# Patient Record
Sex: MALE | Race: WHITE | NOT HISPANIC OR LATINO | Employment: FULL TIME
[De-identification: names, ages, dates, MRNs, and addresses within clinical notes are randomized per-mention and may not be internally consistent; named-entity substitution may affect disease eponyms.]

---

## 2021-02-10 ENCOUNTER — RECORDS - HEALTHEAST (OUTPATIENT)
Dept: ADMINISTRATIVE | Facility: OTHER | Age: 66
End: 2021-02-10

## 2021-03-04 ENCOUNTER — HOSPITAL ENCOUNTER (OUTPATIENT)
Dept: NUCLEAR MEDICINE | Facility: HOSPITAL | Age: 66
Discharge: HOME | End: 2021-03-04
Attending: UROLOGY

## 2021-03-04 DIAGNOSIS — C61 MALIGNANT NEOPLASM OF PROSTATE (H): ICD-10-CM

## 2021-03-11 ENCOUNTER — RECORDS - HEALTHEAST (OUTPATIENT)
Dept: ADMINISTRATIVE | Facility: OTHER | Age: 66
End: 2021-03-11

## 2021-03-14 ENCOUNTER — AMBULATORY - HEALTHEAST (OUTPATIENT)
Dept: SURGERY | Facility: HOSPITAL | Age: 66
End: 2021-03-14

## 2021-03-14 DIAGNOSIS — Z11.59 ENCOUNTER FOR SCREENING FOR OTHER VIRAL DISEASES: ICD-10-CM

## 2021-03-19 ENCOUNTER — HOSPITAL ENCOUNTER (OUTPATIENT)
Dept: MRI IMAGING | Facility: HOSPITAL | Age: 66
Discharge: HOME | End: 2021-03-19
Attending: UROLOGY

## 2021-03-19 DIAGNOSIS — C61 MALIGNANT NEOPLASM OF PROSTATE (H): ICD-10-CM

## 2021-03-19 RX ORDER — TAMSULOSIN HYDROCHLORIDE 0.4 MG/1
0.4 CAPSULE ORAL 2 TIMES DAILY
Status: SHIPPED | COMMUNITY
Start: 2021-03-19 | End: 2021-08-09

## 2021-03-19 ASSESSMENT — MIFFLIN-ST. JEOR: SCORE: 1585.04

## 2021-06-05 VITALS — BODY MASS INDEX: 25.05 KG/M2 | HEIGHT: 70 IN | WEIGHT: 175 LBS

## 2021-06-16 NOTE — PLAN OF CARE
MRI with sedation cancelled due to lack of H&P within 30 days.  Radiologist and MRI tech informed.  Pt refusing to try MRI without sedation and chooses to cancel.

## 2021-06-26 ENCOUNTER — HEALTH MAINTENANCE LETTER (OUTPATIENT)
Age: 66
End: 2021-06-26

## 2021-08-09 ENCOUNTER — APPOINTMENT (OUTPATIENT)
Dept: CT IMAGING | Facility: CLINIC | Age: 66
DRG: 177 | End: 2021-08-09
Attending: EMERGENCY MEDICINE
Payer: MEDICARE

## 2021-08-09 ENCOUNTER — HOSPITAL ENCOUNTER (INPATIENT)
Facility: CLINIC | Age: 66
LOS: 4 days | Discharge: HOME | DRG: 177 | End: 2021-08-13
Attending: EMERGENCY MEDICINE | Admitting: FAMILY MEDICINE
Payer: MEDICARE

## 2021-08-09 DIAGNOSIS — J12.82 PNEUMONIA DUE TO 2019 NOVEL CORONAVIRUS: ICD-10-CM

## 2021-08-09 DIAGNOSIS — U07.1 PNEUMONIA DUE TO 2019 NOVEL CORONAVIRUS: ICD-10-CM

## 2021-08-09 DIAGNOSIS — J96.01 ACUTE RESPIRATORY FAILURE WITH HYPOXIA (H): ICD-10-CM

## 2021-08-09 PROBLEM — R79.89 ELEVATED D-DIMER: Status: ACTIVE | Noted: 2021-08-09

## 2021-08-09 LAB
ALBUMIN SERPL-MCNC: 3.3 G/DL (ref 3.5–5)
ALP SERPL-CCNC: 70 U/L (ref 45–120)
ALT SERPL W P-5'-P-CCNC: 41 U/L (ref 0–45)
ANION GAP SERPL CALCULATED.3IONS-SCNC: 10 MMOL/L (ref 5–18)
AST SERPL W P-5'-P-CCNC: 63 U/L (ref 0–40)
BILIRUB SERPL-MCNC: 0.8 MG/DL (ref 0–1)
BNP SERPL-MCNC: 12 PG/ML (ref 0–60)
BUN SERPL-MCNC: 17 MG/DL (ref 8–22)
CALCIUM SERPL-MCNC: 8.8 MG/DL (ref 8.5–10.5)
CHLORIDE BLD-SCNC: 99 MMOL/L (ref 98–107)
CO2 SERPL-SCNC: 25 MMOL/L (ref 22–31)
CREAT SERPL-MCNC: 0.84 MG/DL (ref 0.7–1.3)
D DIMER PPP FEU-MCNC: 0.91 UG/ML FEU (ref 0–0.5)
ERYTHROCYTE [DISTWIDTH] IN BLOOD BY AUTOMATED COUNT: 14.2 % (ref 10–15)
GFR SERPL CREATININE-BSD FRML MDRD: >90 ML/MIN/1.73M2
GLUCOSE BLD-MCNC: 118 MG/DL (ref 70–125)
HCT VFR BLD AUTO: 42.8 % (ref 40–53)
HGB BLD-MCNC: 14.9 G/DL (ref 13.3–17.7)
HOLD SPECIMEN: NORMAL
MCH RBC QN AUTO: 33.6 PG (ref 26.5–33)
MCHC RBC AUTO-ENTMCNC: 34.8 G/DL (ref 31.5–36.5)
MCV RBC AUTO: 96 FL (ref 78–100)
PLATELET # BLD AUTO: 99 10E3/UL (ref 150–450)
POTASSIUM BLD-SCNC: 3.2 MMOL/L (ref 3.5–5)
POTASSIUM BLD-SCNC: 4 MMOL/L (ref 3.5–5)
PROCALCITONIN SERPL-MCNC: 0.09 NG/ML (ref 0–0.49)
PROT SERPL-MCNC: 7.4 G/DL (ref 6–8)
RBC # BLD AUTO: 4.44 10E6/UL (ref 4.4–5.9)
SARS-COV-2 RNA RESP QL NAA+PROBE: POSITIVE
SODIUM SERPL-SCNC: 134 MMOL/L (ref 136–145)
TROPONIN I SERPL-MCNC: 0.01 NG/ML (ref 0–0.29)
WBC # BLD AUTO: 3.1 10E3/UL (ref 4–11)

## 2021-08-09 PROCEDURE — 71275 CT ANGIOGRAPHY CHEST: CPT

## 2021-08-09 PROCEDURE — 96374 THER/PROPH/DIAG INJ IV PUSH: CPT | Mod: 59

## 2021-08-09 PROCEDURE — 87635 SARS-COV-2 COVID-19 AMP PRB: CPT | Performed by: EMERGENCY MEDICINE

## 2021-08-09 PROCEDURE — XW033E5 INTRODUCTION OF REMDESIVIR ANTI-INFECTIVE INTO PERIPHERAL VEIN, PERCUTANEOUS APPROACH, NEW TECHNOLOGY GROUP 5: ICD-10-PCS | Performed by: FAMILY MEDICINE

## 2021-08-09 PROCEDURE — 84484 ASSAY OF TROPONIN QUANT: CPT | Performed by: EMERGENCY MEDICINE

## 2021-08-09 PROCEDURE — 250N000011 HC RX IP 250 OP 636: Performed by: EMERGENCY MEDICINE

## 2021-08-09 PROCEDURE — 84145 PROCALCITONIN (PCT): CPT | Performed by: EMERGENCY MEDICINE

## 2021-08-09 PROCEDURE — 93005 ELECTROCARDIOGRAM TRACING: CPT

## 2021-08-09 PROCEDURE — 99285 EMERGENCY DEPT VISIT HI MDM: CPT | Mod: 25

## 2021-08-09 PROCEDURE — 250N000013 HC RX MED GY IP 250 OP 250 PS 637: Performed by: STUDENT IN AN ORGANIZED HEALTH CARE EDUCATION/TRAINING PROGRAM

## 2021-08-09 PROCEDURE — 36415 COLL VENOUS BLD VENIPUNCTURE: CPT | Performed by: EMERGENCY MEDICINE

## 2021-08-09 PROCEDURE — 93005 ELECTROCARDIOGRAM TRACING: CPT | Performed by: STUDENT IN AN ORGANIZED HEALTH CARE EDUCATION/TRAINING PROGRAM

## 2021-08-09 PROCEDURE — 83880 ASSAY OF NATRIURETIC PEPTIDE: CPT | Performed by: EMERGENCY MEDICINE

## 2021-08-09 PROCEDURE — 80053 COMPREHEN METABOLIC PANEL: CPT | Performed by: EMERGENCY MEDICINE

## 2021-08-09 PROCEDURE — 84132 ASSAY OF SERUM POTASSIUM: CPT | Performed by: FAMILY MEDICINE

## 2021-08-09 PROCEDURE — 99223 1ST HOSP IP/OBS HIGH 75: CPT | Mod: AI

## 2021-08-09 PROCEDURE — 120N000001 HC R&B MED SURG/OB

## 2021-08-09 PROCEDURE — 85379 FIBRIN DEGRADATION QUANT: CPT | Performed by: EMERGENCY MEDICINE

## 2021-08-09 PROCEDURE — 85014 HEMATOCRIT: CPT | Performed by: EMERGENCY MEDICINE

## 2021-08-09 PROCEDURE — C9803 HOPD COVID-19 SPEC COLLECT: HCPCS

## 2021-08-09 PROCEDURE — 36415 COLL VENOUS BLD VENIPUNCTURE: CPT | Performed by: FAMILY MEDICINE

## 2021-08-09 PROCEDURE — 258N000003 HC RX IP 258 OP 636: Performed by: STUDENT IN AN ORGANIZED HEALTH CARE EDUCATION/TRAINING PROGRAM

## 2021-08-09 PROCEDURE — 250N000011 HC RX IP 250 OP 636: Performed by: STUDENT IN AN ORGANIZED HEALTH CARE EDUCATION/TRAINING PROGRAM

## 2021-08-09 PROCEDURE — 250N000009 HC RX 250: Performed by: STUDENT IN AN ORGANIZED HEALTH CARE EDUCATION/TRAINING PROGRAM

## 2021-08-09 RX ORDER — POLYETHYLENE GLYCOL 3350 17 G/17G
17 POWDER, FOR SOLUTION ORAL DAILY PRN
Status: DISCONTINUED | OUTPATIENT
Start: 2021-08-09 | End: 2021-08-13 | Stop reason: HOSPADM

## 2021-08-09 RX ORDER — TADALAFIL 5 MG/1
5 TABLET ORAL EVERY 24 HOURS
COMMUNITY

## 2021-08-09 RX ORDER — VALACYCLOVIR HYDROCHLORIDE 500 MG/1
500 TABLET, FILM COATED ORAL EVERY OTHER DAY
Status: DISCONTINUED | OUTPATIENT
Start: 2021-08-10 | End: 2021-08-13 | Stop reason: HOSPADM

## 2021-08-09 RX ORDER — ONDANSETRON 2 MG/ML
4 INJECTION INTRAMUSCULAR; INTRAVENOUS EVERY 6 HOURS PRN
Status: DISCONTINUED | OUTPATIENT
Start: 2021-08-09 | End: 2021-08-13 | Stop reason: HOSPADM

## 2021-08-09 RX ORDER — ALBUTEROL SULFATE 90 UG/1
2 AEROSOL, METERED RESPIRATORY (INHALATION) EVERY 4 HOURS PRN
Status: DISCONTINUED | OUTPATIENT
Start: 2021-08-09 | End: 2021-08-13 | Stop reason: HOSPADM

## 2021-08-09 RX ORDER — ONDANSETRON 4 MG/1
4 TABLET, ORALLY DISINTEGRATING ORAL EVERY 6 HOURS PRN
Status: DISCONTINUED | OUTPATIENT
Start: 2021-08-09 | End: 2021-08-13 | Stop reason: HOSPADM

## 2021-08-09 RX ORDER — IOPAMIDOL 755 MG/ML
100 INJECTION, SOLUTION INTRAVASCULAR ONCE
Status: COMPLETED | OUTPATIENT
Start: 2021-08-09 | End: 2021-08-09

## 2021-08-09 RX ORDER — DEXAMETHASONE SODIUM PHOSPHATE 4 MG/ML
6 INJECTION, SOLUTION INTRA-ARTICULAR; INTRALESIONAL; INTRAMUSCULAR; INTRAVENOUS; SOFT TISSUE ONCE
Status: COMPLETED | OUTPATIENT
Start: 2021-08-09 | End: 2021-08-09

## 2021-08-09 RX ORDER — ACETAMINOPHEN 325 MG/1
975 TABLET ORAL EVERY 8 HOURS PRN
Status: DISCONTINUED | OUTPATIENT
Start: 2021-08-09 | End: 2021-08-13 | Stop reason: HOSPADM

## 2021-08-09 RX ORDER — POTASSIUM CHLORIDE 1.5 G/1.58G
40 POWDER, FOR SOLUTION ORAL ONCE
Status: COMPLETED | OUTPATIENT
Start: 2021-08-09 | End: 2021-08-09

## 2021-08-09 RX ORDER — LIDOCAINE 40 MG/G
CREAM TOPICAL
Status: DISCONTINUED | OUTPATIENT
Start: 2021-08-09 | End: 2021-08-13 | Stop reason: HOSPADM

## 2021-08-09 RX ADMIN — DEXAMETHASONE SODIUM PHOSPHATE 6 MG: 4 INJECTION, SOLUTION INTRAMUSCULAR; INTRAVENOUS at 14:24

## 2021-08-09 RX ADMIN — SODIUM CHLORIDE 50 ML: 9 INJECTION, SOLUTION INTRAVENOUS at 21:53

## 2021-08-09 RX ADMIN — ENOXAPARIN SODIUM 40 MG: 100 INJECTION SUBCUTANEOUS at 18:53

## 2021-08-09 RX ADMIN — POTASSIUM CHLORIDE 40 MEQ: 1.5 POWDER, FOR SOLUTION ORAL at 17:56

## 2021-08-09 RX ADMIN — REMDESIVIR 200 MG: 100 INJECTION, POWDER, LYOPHILIZED, FOR SOLUTION INTRAVENOUS at 20:11

## 2021-08-09 RX ADMIN — IOPAMIDOL 65 ML: 755 INJECTION, SOLUTION INTRAVENOUS at 14:34

## 2021-08-09 ASSESSMENT — ACTIVITIES OF DAILY LIVING (ADL)
DRESSING/BATHING_DIFFICULTY: NO
DIFFICULTY_EATING/SWALLOWING: NO
WALKING_OR_CLIMBING_STAIRS_DIFFICULTY: NO
HEARING_DIFFICULTY_OR_DEAF: NO
CONCENTRATING,_REMEMBERING_OR_MAKING_DECISIONS_DIFFICULTY: NO
DIFFICULTY_COMMUNICATING: NO
DOING_ERRANDS_INDEPENDENTLY_DIFFICULTY: NO
WEAR_GLASSES_OR_BLIND: NO
TOILETING_ISSUES: NO
FALL_HISTORY_WITHIN_LAST_SIX_MONTHS: NO
PATIENT_/_FAMILY_COMMUNICATION_STYLE: SPOKEN LANGUAGE (ENGLISH OR BILINGUAL)

## 2021-08-09 ASSESSMENT — ENCOUNTER SYMPTOMS
DYSURIA: 0
FEVER: 1
SORE THROAT: 0
COUGH: 1
CONFUSION: 0
VOMITING: 0
HEADACHES: 0
ABDOMINAL PAIN: 0
DIARRHEA: 0
NAUSEA: 0
SHORTNESS OF BREATH: 1
CHILLS: 1
MYALGIAS: 1

## 2021-08-09 ASSESSMENT — MIFFLIN-ST. JEOR: SCORE: 1534.69

## 2021-08-09 NOTE — H&P
Admission History and Physical   Iván Bills,  1955, MRN [id number]    [facility]  Active Problems:    Acute respiratory failure with hypoxia (H) (2021)    Pneumonia due to 2019 novel coronavirus (2021)      PCP: No primary care provider on file., None   Code status:  Full Code     Extended Emergency Contact Information  Primary Emergency Contact: DANUTA JONES  Mobile Phone: [phone]  Relation: Other       Chief Complaint: Cough and Shortness of Breath       Assessment and Plan:   Iván Bills is a 66 year old male with a history of hypercholesterolemia, prostate cancer s/p prostatectomy, unvaccinated against SARS CoV-2 who presented to the [facility] Emergency Department on the day of admission with complaints of one week of dry cough, loss of appetite, weakness, and 10 lb. weight loss found to have fever and acute respiratory failure with hypoxia.    #Acute respiratory failure with hypoxia  #Positive SARS CoV-2 PCR  Patient unvaccinated for COVID-19 with 7d of dry cough, anorexia, weakness, weight loss, started on 2L NC and dexamethasone 6mg. Differential concerning for COVID-19 PNA vs. pulmonary embolism vs. bacterial PNA vs. ACS. Favor COVID-19 PNA given positive SARS CoV-2 PCR, unvaccinated status, respiratory failure requiring O2 NC, CT chest with LLL opacity and r/o PE, mildly elevated D-dimer. EKG appropriate to r/o ACS.  - O2 supplementation via NC PRN  - CRP, CBC, BMP, D-dimer qAM for 4 days  - EKG  - COVID-19 treatment protocol   - Albuterol prn   - Dexamethasone 6mg po   - Remdesivir 200mg IV  - Pain control   - Acetaminophen q8h prn    #Hyponatremia, mild  #Hypokalemia, mild  Likely 2/2 poor oral intake and weight loss.   - 1L NS bolus x1  - BMP recheck qAM  - Potassium replacement protocol    #Thrombocytopenia  No s/s of bleeding, not at level where transfusion would be appropriate.  - Monitor  - CBC qAM     Chronic Conditions  Prostate CA, s/p prostatectomy  : Hold  "PTA Tadalafil  Herpes simplex : Continue PTA Valacyclovir    Fluids: PO  Diet: Regular   PPX: Enoxaparin (Lovenox) SubQ 40mg, Sequential Compression Boots, Ondansetron prn, Polyethylene glycol daily prn  Disposition: TBD  Status: Inpatient    Patient discussed with attending physician, Dr. Edi Merino, who agrees with the plan.     Kenny Lewis, MS4  [facility]  Please call the senior pager with any questions or concerns, 24/7: [phone].    I have seen and examined the patient.  I have discussed the case with Student Doctor Debbie.  I agree with the findings, assessment and plan.     Chico Guerra MD PGY1  [facility]  August 9, 2021      HPI:    Iván Bills is a 66 year old old male with a past medical history of hypercholesterolemia, prostate cancer s/p prostatectomy (6/2021) who presented to the [facility] Emergency Department on the day of admission with complaints of one week of dry cough and loss of appetite. He initially thought this was due to the poor air quality. He is now feeling weak and has noticed some significant weight loss. ROS negative for headache, chest pain, abdominal pain, subjective fever, LE swelling, abdominal pain, n/v, known sick contacts.    Patient went to urgent care this AM, where he was advised to come to the ER due to concern for COVID-19 pneumonia.    History is provided by the patient, who is a reliable historian.     Emergency Department Course:    Upon presentation to the Emergency Department the patient's vital signs were    ED Triage Vitals [08/09/21 1119]   Enc Vitals Group      BP (!) 149/79      Pulse 112      Resp 16      Temp 99.5  F (37.5  C)      Temp src Oral      SpO2 (S) (!) 88 %      Weight 74.8 kg (165 lb)      Height 1.778 m (5' 10\")      Head Circumference       Peak Flow       Pain Score       Pain Loc       Pain Edu?       Excl. in GC?      Initial evaluation in the Emergency Department: Patient came in at 88% O2 on RA, " was started on 2L of O2 via NC. He is a non-smoker and is non-vaccinated. CMP, CBC, D-dimer, BNP, procalcitonin, troponin, CT abd/pelvis, SARS CoV-2 PCR ordered. Was given dexamethasone 6mg.    Notable results: CT chest showed lower lung opacities consistent with COVID-19 and enlarged ascending aorta. Positive SARS CoV-2 PCR.    Patient was admitted to the Med Surg for further workup and management.     Medical History  Past Medical History:   Diagnosis Date     Cancer (H) 02/2021    prostate      Claustrophobia      Urinary tract obstruction      Patient Active Problem List   Diagnosis     Hypercholesterolemia     Gout     Cyst Of The Right Upper Eyelid     Warts     Cough     Acute respiratory failure with hypoxia (H)     Pneumonia due to 2019 novel coronavirus       Reviewed, changes/additions include: None Surgical History  He  has a past surgical history that includes Replacement Total Knee (Right).      Reviewed, changes/additions include: vasectomy (2009), prostatectomy (2021) Social History & Habits  he  reports that he has never smoked. He has never used smokeless tobacco. He reports current alcohol use of about 2.0 standard drinks of alcohol per week. He reports that he does not use drugs.     Reviewed, changes/additions include:  Lives at home with daughter and granddaughter.    Code Status: Full Code  Marital Status:   Work History: Semi-retired trophymaker  Surrogate decision maker/POA: Daughter        Allergies  No Known Allergies    Reviewed, changes/additions include: None Family History  No known conditions or diagnoses in family members.    Reviewed, changes/additions include: None   Psychosocial Needs  Social History     Social History Narrative     Not on file     Additional psychosocial needs reviewed per nursing assessment.       Prior to Admission Medications   (Not in a hospital admission)          Review of Systems:  Pertinent items are noted in HPI.       Physical Exam:   Temp:   [99.5  F (37.5  C)] 99.5  F (37.5  C)  Pulse:  [112] 112  Resp:  [16] 16  BP: (149)/(79) 149/79  SpO2:  [88 %] 88 %    General appearance: Alert, cooperative, appears stated age and no distress  Head: Normocephalic, without obvious abnormality, atraumatic  Eyes: EOMI  Ears: Hearing grossly intact  Lungs: Crackles and diminished breath sounds bilaterally  Heart: Regular rate and rhythm, S1, S2 normal, no murmur, click, rub or gallop  Abdomen: Soft, non-tender; bowel sounds normal; infra-umbilical incision present and well-healed  Extremities: Extremities normal, atraumatic, no LE edema  Pulses: 2+ and symmetric DP/PT pulses  Skin: Skin color, texture, turgor normal. No rashes or lesions, non-diaphoretic  Neurologic: CNII-XII intact bilaterally, and normal strength and sensation in all 4 extremities    Pertinent Labs  Lab Results: personally reviewed.   Results for orders placed or performed during the hospital encounter of 08/09/21   CBC (+ platelets, no diff)     Status: Abnormal   Result Value Ref Range    WBC Count 3.1 (L) 4.0 - 11.0 10e3/uL    RBC Count 4.44 4.40 - 5.90 10e6/uL    Hemoglobin 14.9 13.3 - 17.7 g/dL    Hematocrit 42.8 40.0 - 53.0 %    MCV 96 78 - 100 fL    MCH 33.6 (H) 26.5 - 33.0 pg    MCHC 34.8 31.5 - 36.5 g/dL    RDW 14.2 10.0 - 15.0 %    Platelet Count 99 (L) 150 - 450 10e3/uL   Comprehensive metabolic panel     Status: Abnormal   Result Value Ref Range    Sodium 134 (L) 136 - 145 mmol/L    Potassium 3.2 (L) 3.5 - 5.0 mmol/L    Chloride 99 98 - 107 mmol/L    Carbon Dioxide (CO2) 25 22 - 31 mmol/L    Anion Gap 10 5 - 18 mmol/L    Urea Nitrogen 17 8 - 22 mg/dL    Creatinine 0.84 0.70 - 1.30 mg/dL    Calcium 8.8 8.5 - 10.5 mg/dL    Glucose 118 70 - 125 mg/dL    Alkaline Phosphatase 70 45 - 120 U/L    AST 63 (H) 0 - 40 U/L    ALT 41 0 - 45 U/L    Protein Total 7.4 6.0 - 8.0 g/dL    Albumin 3.3 (L) 3.5 - 5.0 g/dL    Bilirubin Total 0.8 0.0 - 1.0 mg/dL    GFR Estimate >90 >60 mL/min/1.73m2   D dimer  quantitative     Status: Abnormal   Result Value Ref Range    D-Dimer Quantitative 0.91 (H) 0.00 - 0.50 ug/mL FEU    Narrative    This D-dimer assay is intended for use in conjunction with a clinical pretest probability assessment model to exclude pulmonary embolism (PE) and deep venous thrombosis (DVT) in outpatients suspected of PE or DVT. The cut-off value is 0.50 ug/mL FEU.   Troponin I (now)     Status: Normal   Result Value Ref Range    Troponin I 0.01 0.00 - 0.29 ng/mL   B-Type Natriuretic Peptide ([facility] Only)     Status: Normal   Result Value Ref Range    BNP 12 0 - 60 pg/mL   Procalcitonin     Status: Normal   Result Value Ref Range    Procalcitonin 0.09 0.00 - 0.49 ng/mL   SARS-COV2 (COVID-19) Virus RT-PCR     Status: Abnormal    Specimen: Nasopharyngeal; Swab   Result Value Ref Range    SARS CoV2 PCR Positive (A) Negative    Narrative    Testing was performed using the rima  SARS-CoV-2 & Influenza A/B Assay on the rima  Ann-Marie  System.  This test should be ordered for the detection of SARS-COV-2 in individuals who meet SARS-CoV-2 clinical and/or epidemiological criteria. Test performance is unknown in asymptomatic patients.  This test is for in vitro diagnostic use under the FDA EUA for laboratories certified under CLIA to perform moderate and/or high complexity testing. This test has not been FDA cleared or approved.  A negative test does not rule out the presence of PCR inhibitors in the specimen or target RNA in concentration below the limit of detection for the assay. The possibility of a false negative should be considered if the patient's recent exposure or clinical presentation suggests COVID-19.  [facility] are certified under the Clinical Laboratory Improvement Amendments of 1988 (CLIA-88) as qualified to perform moderate and/or high complexity laboratory testing.   Symptomatic COVID-19 Virus (Coronavirus) by PCR Nasopharyngeal     Status: Abnormal    Specimen:  Nasopharyngeal; Swab    Narrative    The following orders were created for panel order Symptomatic COVID-19 Virus (Coronavirus) by PCR Nasopharyngeal.  Procedure                               Abnormality         Status                     ---------                               -----------         ------                     SARS-COV2 (COVID-19) Vir...[[id number]]  Abnormal            Final result                 Please view results for these tests on the individual orders.        Pertinent Radiology:  Radiology Results: personally reviewed.   CT chest shows LLL opacity consistent with COVID-19 infection, no evidence of pulmonary embolism, and ascending aorta enlargement.    Cardiographics:   EKG Results: not yet collected.

## 2021-08-09 NOTE — PHARMACY-ADMISSION MEDICATION HISTORY
Pharmacy Note - Admission Medication History    Pertinent Provider Information: Patient confirms only two medications. Takes valtrex every OTHER day.      ______________________________________________________________________    Prior To Admission (PTA) med list completed and updated in EMR.       PTA Med List   Medication Sig Last Dose     tadalafil (CIALIS) 5 MG tablet Take 5 mg by mouth every 24 hours  8/9/2021 at Unknown time     valACYclovir (VALTREX) 500 MG tablet [VALACYCLOVIR (VALTREX) 500 MG TABLET] TAKE ONE TABLET BY MOUTH ONCE DAILY (Patient taking differently: Take 500 mg by mouth See Admin Instructions TAKE 1 TABLET EVERY OTHER DAY) 8/8/2021 at Unknown time       Information source(s): Patient and CareEverywhere/SureScripts  Method of interview communication: phone [phone]    Summary of Changes to PTA Med List  New: tadalafil  Discontinued: tamsulosin-confirmed not taking; no recent fill  Changed: valacyclovir-patient takes every other day    Patient was asked about OTC/herbal products specifically.  PTA med list reflects this.    In the past week, patient estimated taking medication this percent of the time:  greater than 90%.    Allergies were reviewed, assessed, and updated with the patient.      Patient does not use any multi-dose medications prior to admission.    The information provided in this note is only as accurate as the sources available at the time of the update(s).    Thank you for the opportunity to participate in the care of this patient.    Manuel Ham RPH  8/9/2021 4:03 PM

## 2021-08-09 NOTE — ED TRIAGE NOTES
Patient arrives with shortness of breath and cough for 7 days o2 sat 88% RA. Here for an xray and r/o covid from the clinic.

## 2021-08-09 NOTE — ED PROVIDER NOTES
EMERGENCY DEPARTMENT ENCOUNTER      NAME: Iván Bills  AGE: 66 year old male  YOB: 1955  MRN: [id number]  EVALUATION DATE & TIME: 2021 11:56 AM    PCP: No primary care provider on file.        Chief Complaint   Patient presents with     Cough     Shortness of Breath         FINAL IMPRESSION:  1. Pneumonia due to 2019 novel coronavirus    2. Acute respiratory failure with hypoxia (H)          ED COURSE & MEDICAL DECISION MAKIN:13 PM I met with patient to gather history and perform an initial exam. Plans for ED stay discussed. Provider wore N95 mask, face shield, gown, and gloves.   1:49 PM I rechecked and updated patient.     Pertinent Labs & Imaging studies reviewed. (See chart for details)  66 year old male presents to the Emergency Department for evaluation of this of breath    ED Course as of Aug 09 140   Mon Aug 09, 2021   1228 Patient presents hypoxic with symptoms consistent with Covid.  Also considered pneumonia, bronchitis, myocarditis      1228 Patient on 2 L of oxygen.  Will need to be admitted.  Plan for chest imaging, labs, Covid test      1400 Covid test came back positive.  Elevated D-dimer therefore CTA PE ordered.  IV dexamethasone ordered.  Patient signed to Dr. Moore pending transfer to capable facility.            At the conclusion of the encounter I discussed the results of all of the tests and the disposition. The questions were answered. The patient or family acknowledged understanding and was agreeable with the care plan.         Critical Care  Performed by: Alexandre Cedillo MD  Total critical care time: 20 minutes  Critical care time was exclusive of separately billable procedures and treating other patients.  Critical care was necessary to treat or prevent imminent or life-threatening deterioration of the following conditions: hypoxia requiring oxygen  Critical care was time spent personally by me on the following activities: development of treatment plan with  patient or surrogate, discussions with consultants, examination of patient, evaluation of patient's response to treatment, obtaining history from patient or surrogate, ordering and performing treatments and interventions, ordering and review of laboratory studies, ordering and review of radiographic studies and re-evaluation of patient's condition, this excludes any separately billable procedures.        MEDICATIONS GIVEN IN THE EMERGENCY:  Medications   dexamethasone (DECADRON) injection 6 mg (has no administration in time range)       NEW PRESCRIPTIONS STARTED AT TODAY'S ER VISIT  New Prescriptions    No medications on file            =================================================================    HPI    Patient information was obtained from: Patient    Use of Intrepreter: N/A         Iván Bills is a 66 year old male with a pertinent history of hypercholesteremia who presents to this ED via private car accompanied by self for evaluation of cough and fever.     Patient presents with 7 days of fever, shortness of breath, dry cough, and myalgias. Symptoms have been constant since onset and gradually worsening. He denies recent sick contacts or COVID-19 exposures. He has not been vaccinated for COVID-19. He has not taken any medication prior to ED arrival. Denies diarrhea, chest pain, abdominal pain, nausea, vomiting, headache, or any other complaints at this time.    REVIEW OF SYSTEMS   Review of Systems   Constitutional: Positive for chills and fever (7 days).   HENT: Negative for sore throat.    Respiratory: Positive for cough (dry) and shortness of breath.    Cardiovascular: Negative for chest pain.   Gastrointestinal: Negative for abdominal pain, diarrhea, nausea and vomiting.   Genitourinary: Negative.  Negative for dysuria.   Musculoskeletal: Positive for myalgias (7 days).   Skin: Negative for rash.   Allergic/Immunologic: Negative for immunocompromised state.   Neurological: Negative for headaches.    Psychiatric/Behavioral: Negative for confusion.        PAST MEDICAL HISTORY:  Past Medical History:   Diagnosis Date     Cancer (H) 02/2021    prostate      Claustrophobia      Urinary tract obstruction        PAST SURGICAL HISTORY:  Past Surgical History:   Procedure Laterality Date     REPLACEMENT TOTAL KNEE Right            CURRENT MEDICATIONS:    Patient's Medications   New Prescriptions    No medications on file   Previous Medications    TAMSULOSIN (FLOMAX) 0.4 MG CAP    [TAMSULOSIN (FLOMAX) 0.4 MG CAP] Take 0.4 mg by mouth 2 (two) times a day.    VALACYCLOVIR (VALTREX) 500 MG TABLET    [VALACYCLOVIR (VALTREX) 500 MG TABLET] TAKE ONE TABLET BY MOUTH ONCE DAILY   Modified Medications    No medications on file   Discontinued Medications    No medications on file       ALLERGIES:  No Known Allergies    FAMILY HISTORY:  No family history on file.    SOCIAL HISTORY:   Social History     Socioeconomic History     Marital status:      Spouse name: Not on file     Number of children: Not on file     Years of education: Not on file     Highest education level: Not on file   Occupational History     Not on file   Tobacco Use     Smoking status: Never Smoker     Smokeless tobacco: Never Used   Substance and Sexual Activity     Alcohol use: Yes     Alcohol/week: 2.0 standard drinks     Drug use: Never     Sexual activity: Not on file   Other Topics Concern     Not on file   Social History Narrative     Not on file     Social Determinants of Health     Financial Resource Strain:      Difficulty of Paying Living Expenses:    Food Insecurity:      Worried About Running Out of Food in the Last Year:      Ran Out of Food in the Last Year:    Transportation Needs:      Lack of Transportation (Medical):      Lack of Transportation (Non-Medical):    Physical Activity:      Days of Exercise per Week:      Minutes of Exercise per Session:    Stress:      Feeling of Stress :    Social Connections:      Frequency of  "Communication with Friends and Family:      Frequency of Social Gatherings with Friends and Family:      Attends [employer] Services:      Active Member of Clubs or Organizations:      Attends Club or Organization Meetings:      Marital Status:    Intimate Partner Violence:      Fear of Current or Ex-Partner:      Emotionally Abused:      Physically Abused:      Sexually Abused:        VITALS:  Patient Vitals for the past 24 hrs:   BP Temp Temp src Pulse Resp SpO2 Height Weight   08/09/21 1119 (!) 149/79 99.5  F (37.5  C) Oral 112 16 (!) 88 % 1.778 m (5' 10\") 74.8 kg (165 lb)       PHYSICAL EXAM      Vitals: BP (!) 149/79   Pulse 112   Temp 99.5  F (37.5  C) (Oral)   Resp 16   Ht 1.778 m (5' 10\")   Wt 74.8 kg (165 lb)   SpO2 (S) (!) 88%   BMI 23.68 kg/m    General: Appears in no acute distress, awake, alert, interactive.  Eyes: Conjunctivae non-injected. Sclera anicteric.  HENT: Atraumatic.  Neck: Supple.  Respiratory/Chest: Respiration unlabored. Crackles bilaterally, but no increased work of breathing. On 2 L O2 NC, O2 saturation 96%.  Heart: RRR  Abdomen: non distended  Musculoskeletal: Normal extremities. No edema or erythema.  Skin: Normal color. No rash or diaphoresis.  Neurologic: Face symmetric, moves all extremities spontaneously. Speech clear.  Psychiatric: Oriented to person, place, and time. Affect appropriate.    LAB:  All pertinent labs reviewed and interpreted.  Results for orders placed or performed during the hospital encounter of 08/09/21   CBC (+ platelets, no diff)   Result Value Ref Range    WBC Count 3.1 (L) 4.0 - 11.0 10e3/uL    RBC Count 4.44 4.40 - 5.90 10e6/uL    Hemoglobin 14.9 13.3 - 17.7 g/dL    Hematocrit 42.8 40.0 - 53.0 %    MCV 96 78 - 100 fL    MCH 33.6 (H) 26.5 - 33.0 pg    MCHC 34.8 31.5 - 36.5 g/dL    RDW 14.2 10.0 - 15.0 %    Platelet Count 99 (L) 150 - 450 10e3/uL   Comprehensive metabolic panel   Result Value Ref Range    Sodium 134 (L) 136 - 145 mmol/L    Potassium 3.2 " (L) 3.5 - 5.0 mmol/L    Chloride 99 98 - 107 mmol/L    Carbon Dioxide (CO2) 25 22 - 31 mmol/L    Anion Gap 10 5 - 18 mmol/L    Urea Nitrogen 17 8 - 22 mg/dL    Creatinine 0.84 0.70 - 1.30 mg/dL    Calcium 8.8 8.5 - 10.5 mg/dL    Glucose 118 70 - 125 mg/dL    Alkaline Phosphatase 70 45 - 120 U/L    AST 63 (H) 0 - 40 U/L    ALT 41 0 - 45 U/L    Protein Total 7.4 6.0 - 8.0 g/dL    Albumin 3.3 (L) 3.5 - 5.0 g/dL    Bilirubin Total 0.8 0.0 - 1.0 mg/dL    GFR Estimate >90 >60 mL/min/1.73m2   D dimer quantitative   Result Value Ref Range    D-Dimer Quantitative 0.91 (H) 0.00 - 0.50 ug/mL FEU   Troponin I (now)   Result Value Ref Range    Troponin I 0.01 0.00 - 0.29 ng/mL   B-Type Natriuretic Peptide ([facility] Only)   Result Value Ref Range    BNP 12 0 - 60 pg/mL   SARS-COV2 (COVID-19) Virus RT-PCR    Specimen: Nasopharyngeal; Swab   Result Value Ref Range    SARS CoV2 PCR Positive (A) Negative       RADIOLOGY:  Reviewed all pertinent imaging. Please see official radiology report.  CT Chest Pulmonary Embolism w Contrast    (Results Pending)           I, Mercy Gonsales, am serving as a scribe to document services personally performed by Dr. Cedillo based on my observation and the provider's statements to me. I, Colin Cedillo MD attest that Mercy Gonsales is acting in a scribe capacity, has observed my performance of the services and has documented them in accordance with my direction.    Colin Cedillo M.D.  Emergency Medicine  [facility] EMERGENCY ROOM  [address] [address] [phone]  Dept: [phone]       Alexandre Cedillo MD  08/09/21 [id number]

## 2021-08-10 PROBLEM — Z90.79 S/P PROSTATECTOMY: Status: ACTIVE | Noted: 2021-08-10

## 2021-08-10 LAB
ANION GAP SERPL CALCULATED.3IONS-SCNC: 10 MMOL/L (ref 5–18)
BUN SERPL-MCNC: 18 MG/DL (ref 8–22)
C REACTIVE PROTEIN LHE: 6.5 MG/DL (ref 0–0.8)
CALCIUM SERPL-MCNC: 8.9 MG/DL (ref 8.5–10.5)
CHLORIDE BLD-SCNC: 103 MMOL/L (ref 98–107)
CO2 SERPL-SCNC: 23 MMOL/L (ref 22–31)
CREAT SERPL-MCNC: 0.76 MG/DL (ref 0.7–1.3)
D DIMER PPP FEU-MCNC: 0.5 UG/ML FEU (ref 0–0.5)
ERYTHROCYTE [DISTWIDTH] IN BLOOD BY AUTOMATED COUNT: 14.5 % (ref 10–15)
GFR SERPL CREATININE-BSD FRML MDRD: >90 ML/MIN/1.73M2
GLUCOSE BLD-MCNC: 136 MG/DL (ref 70–125)
HCT VFR BLD AUTO: 41.8 % (ref 40–53)
HGB BLD-MCNC: 14.3 G/DL (ref 13.3–17.7)
HOLD SPECIMEN: NORMAL
MCH RBC QN AUTO: 32.9 PG (ref 26.5–33)
MCHC RBC AUTO-ENTMCNC: 34.2 G/DL (ref 31.5–36.5)
MCV RBC AUTO: 96 FL (ref 78–100)
PLATELET # BLD AUTO: 114 10E3/UL (ref 150–450)
POTASSIUM BLD-SCNC: 3.8 MMOL/L (ref 3.5–5)
RBC # BLD AUTO: 4.35 10E6/UL (ref 4.4–5.9)
SODIUM SERPL-SCNC: 136 MMOL/L (ref 136–145)
WBC # BLD AUTO: 2.6 10E3/UL (ref 4–11)

## 2021-08-10 PROCEDURE — 250N000011 HC RX IP 250 OP 636: Performed by: STUDENT IN AN ORGANIZED HEALTH CARE EDUCATION/TRAINING PROGRAM

## 2021-08-10 PROCEDURE — 36415 COLL VENOUS BLD VENIPUNCTURE: CPT | Performed by: STUDENT IN AN ORGANIZED HEALTH CARE EDUCATION/TRAINING PROGRAM

## 2021-08-10 PROCEDURE — 80048 BASIC METABOLIC PNL TOTAL CA: CPT | Performed by: STUDENT IN AN ORGANIZED HEALTH CARE EDUCATION/TRAINING PROGRAM

## 2021-08-10 PROCEDURE — 250N000012 HC RX MED GY IP 250 OP 636 PS 637: Performed by: STUDENT IN AN ORGANIZED HEALTH CARE EDUCATION/TRAINING PROGRAM

## 2021-08-10 PROCEDURE — 86141 C-REACTIVE PROTEIN HS: CPT | Performed by: STUDENT IN AN ORGANIZED HEALTH CARE EDUCATION/TRAINING PROGRAM

## 2021-08-10 PROCEDURE — 85027 COMPLETE CBC AUTOMATED: CPT | Performed by: STUDENT IN AN ORGANIZED HEALTH CARE EDUCATION/TRAINING PROGRAM

## 2021-08-10 PROCEDURE — 258N000003 HC RX IP 258 OP 636: Performed by: STUDENT IN AN ORGANIZED HEALTH CARE EDUCATION/TRAINING PROGRAM

## 2021-08-10 PROCEDURE — 99233 SBSQ HOSP IP/OBS HIGH 50: CPT | Mod: GC

## 2021-08-10 PROCEDURE — 120N000001 HC R&B MED SURG/OB

## 2021-08-10 PROCEDURE — 250N000009 HC RX 250: Performed by: STUDENT IN AN ORGANIZED HEALTH CARE EDUCATION/TRAINING PROGRAM

## 2021-08-10 PROCEDURE — 250N000013 HC RX MED GY IP 250 OP 250 PS 637: Performed by: STUDENT IN AN ORGANIZED HEALTH CARE EDUCATION/TRAINING PROGRAM

## 2021-08-10 PROCEDURE — 85379 FIBRIN DEGRADATION QUANT: CPT | Performed by: STUDENT IN AN ORGANIZED HEALTH CARE EDUCATION/TRAINING PROGRAM

## 2021-08-10 RX ADMIN — ENOXAPARIN SODIUM 40 MG: 100 INJECTION SUBCUTANEOUS at 18:41

## 2021-08-10 RX ADMIN — DEXAMETHASONE 6 MG: 2 TABLET ORAL at 13:02

## 2021-08-10 RX ADMIN — SODIUM CHLORIDE 50 ML: 9 INJECTION, SOLUTION INTRAVENOUS at 17:09

## 2021-08-10 RX ADMIN — REMDESIVIR 100 MG: 100 INJECTION, POWDER, LYOPHILIZED, FOR SOLUTION INTRAVENOUS at 17:07

## 2021-08-10 RX ADMIN — VALACYCLOVIR HYDROCHLORIDE 500 MG: 500 TABLET, FILM COATED ORAL at 09:27

## 2021-08-10 ASSESSMENT — MIFFLIN-ST. JEOR: SCORE: 1516.99

## 2021-08-10 NOTE — PLAN OF CARE
Problem: Gas Exchange Impaired  Goal: Optimal Gas Exchange  Outcome: No Change     Pt O2 sats remaining around 95% overnight on 4 LPM O2 NC. Continuous pulse ox in place. Pt denies SOB. Frequent, dry cough present. Pt up independently to bathroom and calling appropriately for assistance. Call light within reach.

## 2021-08-10 NOTE — PLAN OF CARE
Patients vital signs stable, sating at 97% on 4L O2. No complaints of pain or SOB. Patient tolerating oral intake. Saline locked. Patient up to bathroom with SBA, patient steady, but complaining of weakness. IV remdesivir given.

## 2021-08-10 NOTE — PROGRESS NOTES
Pt arrived to unit. VSS. Pt educated on plan of care, use of call light, and how to call for meals. Pt complains of weakness. Denies SOB and pain.  Lovenox given. Pt tolerated well. Pt sating at 97% on 4L of O2. RN started admission. Will pass on to on coming RN. Safety precautions maintained.

## 2021-08-10 NOTE — PLAN OF CARE
Problem: Adult Inpatient Plan of Care  Goal: Optimal Comfort and Wellbeing  Outcome: Improving     Problem: Gas Exchange Impaired  Goal: Optimal Gas Exchange  Outcome: Improving     Pt is on 3.5L O2 NC. Some dry nonproductive cough noted. Able to tolerate breakfast 100%. Denies having pain. K+ 3.8, to be rechecked in AM. Call light within reach.

## 2021-08-10 NOTE — PROGRESS NOTES
"CLINICAL NUTRITION SERVICES - ASSESSMENT NOTE     Nutrition Prescription    RECOMMENDATIONS FOR MDs/PROVIDERS TO ORDER:  Malnutrition Status:    None noted        REASON FOR ASSESSMENT  Iván Bills is a/an 66 year old male assessed by the dietitian for Admission Nutrition Risk Screen. Pt admitted with resp failure, Covid-19    NUTRITION HISTORY  He dropped a little weight, not significant with start of covid. States appetite is really good now     CURRENT NUTRITION ORDERS  Diet: Regular   Intake/Tolerance: 100%     LABS  Labs reviewed, CRP-6.5    MEDICATIONS  Medications reviewed; Remdesivir, Decadron    ANTHROPOMETRICS  Height: 177.8 cm (5' 10\")  Most Recent Weight: 73.1 kg (161 lb 1.6 oz)    IBW: 75 kg  BMI: Normal BMI  Weight History:   Wt Readings from Last 5 Encounters:   08/10/21 73.1 kg (161 lb 1.6 oz)     Dosing Weight: 73 kg    ASSESSED NUTRITION NEEDS  Estimated Energy Needs: 4960-1519 kcals/day (25 - 30 kcals/kg)  Justification: Maintenance  Estimated Protein Needs: 73-88 grams protein/day (1 - 1.2 grams of pro/kg)  Justification: Maintenance  Estimated Fluid Needs: 5722-9139 mL/day (25 - 30 mL/kg)   Justification: Maintenance    MALNUTRITION  Pt does not meet 2 criteria for malnutrition     NUTRITION DIAGNOSIS  None at this time     INTERVENTIONS  Implementation  None at this time     Goals  Continue to meet est needs      Monitoring/Evaluation  Progress toward goals will be monitored and evaluated per protocol.  "

## 2021-08-10 NOTE — PROGRESS NOTES
Daily Progress Note    Assessment & Plan: Iván Bills is a 66 year old male with a past medical history of prostate cancer s/p prostatectomy on 6/8/2021 admitted for acute respiratory failure 2/2 Covid infection.     Principal Problem:    Acute respiratory failure with hypoxia (H)  Active Problems:    Pneumonia due to 2019 novel coronavirus    Elevated d-dimer    #Acute respiratory failure with hypoxia 2/2 SARS CoV-2 infection  Patient had 1 week of cough and anorexia. Tested positive for Covid in the [facility] ED on 8/9/2021. Patient is unvaccinated against Covid. As of 8/10, D-dimer decreased from 0.91 to 0.50. CRP elevated at 6.5. O2 is at 96% on 4L NC.  - O2 supplementation via NC PRN, wean as tolerated  - CRP, CBC, BMP, D-dimer qAM for 4 days  - COVID-19 treatment protocol              - Albuterol prn              - Dexamethasone 6mg po              - Remdesivir 200mg IV  - Pain control              - Acetaminophen q8h prn    #Hyponatremia, mild, resolved  #Hypokalemia, mild, resolved  Likely 2/2 poor oral intake and weight loss, resolved as of 8/10.  - Potassium replacement protocol     #Thrombocytopenia  No s/s of bleeding as of 8/10, not at level where transfusion would be appropriate.  - Monitor  - CBC qAM     Chronic Conditions  Prostate CA, s/p prostatectomy  : Hold PTA Tadalafil  Herpes simplex : Continue PTA Valacyclovir    Diet: Regular   Fluids: PO   VTE Prophylaxis: Enoxaparin SubQ 40 mg     Discharge Planning discussed with Dr. Merino  Barriers to discharge: Medical, requiring supplemental O2  Anticipated discharge: 1-3 days  Disposition:  Home  Status: Inpatient admit  Length of Stay: 1     Patient discussed with attending physician, Dr. Eid Mernio, who agrees with the plan.     Kenny Lewis, MS4, 8/10/2021  [facility] Family Medicine Residency Program  Please call the senior pager with any questions or concerns, 24/7: [phone].    I have seen and examined the  "patient.  I have discussed the case with Student Doctor Kenny Lewis.  I agree with the findings, assessment and plan.     Chico Guerra MD PGY1  [facility] Medicine Residency  August 10, 2021    Subjective/Interval events:   Patient slept well over night with no acute events. Reports improvement in weakness and reduced cough. Hasn't needed any prn pain medications. BMs and urination have been normal. Requesting information about CT chest findings.    ROS: Negative for chest pain, swelling, HA, lightheadedness, n/v, urine or bowel changes    Objective  Vital signs in last 24 hours  Temp:  [97.7  F (36.5  C)-99.2  F (37.3  C)] 97.9  F (36.6  C)  Pulse:  [] 71  Resp:  [16] 16  BP: (128-187)/() 145/82  FiO2 (%):  [4 %] 4 %  SpO2:  [93 %-97 %] 94 %    Physical Exam  BP (!) 145/82 (BP Location: Right arm)   Pulse 71   Temp 97.9  F (36.6  C) (Oral)   Resp 16   Ht 1.778 m (5' 10\")   Wt 73.1 kg (161 lb 1.6 oz)   SpO2 94%   BMI 23.12 kg/m      General Appearance:    Alert, cooperative, no distress, appears stated age   Head:    Normocephalic, without obvious abnormality, atraumatic   Eyes:    PERRL, EOMI   Lungs:     Crackles in lung bases bilaterally, no labored breathing   Heart:    Regular rate and rhythm, S1 and S2 normal, no murmur, rub   or gallop   Extremities:   Extremities normal, atraumatic, no cyanosis or edema   Pulses:   2+ and symmetric all extremities         Intake/Output last 3 shifts  I/O last 3 completed shifts:  In: 140 [P.O.:140]  Out: -   Pertinent Labs   Recent Labs   Lab 08/10/21  0622 08/09/21  1254   WBC 2.6* 3.1*   HGB 14.3 14.9   HCT 41.8 42.8   * 99*     Recent Labs   Lab 08/10/21  0622 08/09/21  1254    134*   CO2 23 25   BUN 18 17   ALBUMIN  --  3.3*   ALKPHOS  --  70   ALT  --  41   AST  --  63*     Pertinent Radiology    CT Chest Pulmonary Embolism w Contrast    Result Date: 8/9/2021  IMPRESSION: 1.  Motion artifact. 2.  No obvious pulmonary embolism. 3.  " Mild-moderate left lower lung predominant opacities typical of COVID. 4.  Enlarged ascending aorta up to 4.2 cm.   Results discussed with patient.    Current Medications.     remdesivir  100 mg Intravenous Q24H    And     sodium chloride 0.9%  50 mL Intravenous Q24H     dexamethasone  6 mg Oral Daily     enoxaparin ANTICOAGULANT  40 mg Subcutaneous Q24H     sodium chloride (PF)  3 mL Intracatheter Q8H     valACYclovir  500 mg Oral Every Other Day     PRN: acetaminophen, albuterol

## 2021-08-11 LAB
ANION GAP SERPL CALCULATED.3IONS-SCNC: 10 MMOL/L (ref 5–18)
ATRIAL RATE - MUSE: 76 BPM
BUN SERPL-MCNC: 19 MG/DL (ref 8–22)
C REACTIVE PROTEIN LHE: 2.7 MG/DL (ref 0–0.8)
CALCIUM SERPL-MCNC: 8.8 MG/DL (ref 8.5–10.5)
CHLORIDE BLD-SCNC: 106 MMOL/L (ref 98–107)
CO2 SERPL-SCNC: 23 MMOL/L (ref 22–31)
CREAT SERPL-MCNC: 0.73 MG/DL (ref 0.7–1.3)
D DIMER PPP FEU-MCNC: 0.44 UG/ML FEU (ref 0–0.5)
DIASTOLIC BLOOD PRESSURE - MUSE: NORMAL MMHG
ERYTHROCYTE [DISTWIDTH] IN BLOOD BY AUTOMATED COUNT: 14.5 % (ref 10–15)
GFR SERPL CREATININE-BSD FRML MDRD: >90 ML/MIN/1.73M2
GLUCOSE BLD-MCNC: 134 MG/DL (ref 70–125)
HCT VFR BLD AUTO: 41.7 % (ref 40–53)
HGB BLD-MCNC: 14.1 G/DL (ref 13.3–17.7)
INTERPRETATION ECG - MUSE: NORMAL
MCH RBC QN AUTO: 33 PG (ref 26.5–33)
MCHC RBC AUTO-ENTMCNC: 33.8 G/DL (ref 31.5–36.5)
MCV RBC AUTO: 98 FL (ref 78–100)
P AXIS - MUSE: 60 DEGREES
PLATELET # BLD AUTO: 151 10E3/UL (ref 150–450)
POTASSIUM BLD-SCNC: 3.6 MMOL/L (ref 3.5–5)
PR INTERVAL - MUSE: 146 MS
QRS DURATION - MUSE: 74 MS
QT - MUSE: 424 MS
QTC - MUSE: 477 MS
R AXIS - MUSE: 39 DEGREES
RBC # BLD AUTO: 4.27 10E6/UL (ref 4.4–5.9)
SODIUM SERPL-SCNC: 139 MMOL/L (ref 136–145)
SYSTOLIC BLOOD PRESSURE - MUSE: NORMAL MMHG
T AXIS - MUSE: 38 DEGREES
VENTRICULAR RATE- MUSE: 76 BPM
WBC # BLD AUTO: 3.2 10E3/UL (ref 4–11)

## 2021-08-11 PROCEDURE — 86141 C-REACTIVE PROTEIN HS: CPT | Performed by: STUDENT IN AN ORGANIZED HEALTH CARE EDUCATION/TRAINING PROGRAM

## 2021-08-11 PROCEDURE — 250N000011 HC RX IP 250 OP 636: Performed by: STUDENT IN AN ORGANIZED HEALTH CARE EDUCATION/TRAINING PROGRAM

## 2021-08-11 PROCEDURE — 85014 HEMATOCRIT: CPT | Performed by: STUDENT IN AN ORGANIZED HEALTH CARE EDUCATION/TRAINING PROGRAM

## 2021-08-11 PROCEDURE — 36415 COLL VENOUS BLD VENIPUNCTURE: CPT | Performed by: STUDENT IN AN ORGANIZED HEALTH CARE EDUCATION/TRAINING PROGRAM

## 2021-08-11 PROCEDURE — 99233 SBSQ HOSP IP/OBS HIGH 50: CPT | Mod: GC

## 2021-08-11 PROCEDURE — 250N000009 HC RX 250: Performed by: STUDENT IN AN ORGANIZED HEALTH CARE EDUCATION/TRAINING PROGRAM

## 2021-08-11 PROCEDURE — 85379 FIBRIN DEGRADATION QUANT: CPT | Performed by: STUDENT IN AN ORGANIZED HEALTH CARE EDUCATION/TRAINING PROGRAM

## 2021-08-11 PROCEDURE — 80048 BASIC METABOLIC PNL TOTAL CA: CPT | Performed by: STUDENT IN AN ORGANIZED HEALTH CARE EDUCATION/TRAINING PROGRAM

## 2021-08-11 PROCEDURE — 250N000012 HC RX MED GY IP 250 OP 636 PS 637: Performed by: STUDENT IN AN ORGANIZED HEALTH CARE EDUCATION/TRAINING PROGRAM

## 2021-08-11 PROCEDURE — 258N000003 HC RX IP 258 OP 636: Performed by: STUDENT IN AN ORGANIZED HEALTH CARE EDUCATION/TRAINING PROGRAM

## 2021-08-11 PROCEDURE — 120N000001 HC R&B MED SURG/OB

## 2021-08-11 RX ADMIN — DEXAMETHASONE 6 MG: 2 TABLET ORAL at 13:41

## 2021-08-11 RX ADMIN — ENOXAPARIN SODIUM 40 MG: 100 INJECTION SUBCUTANEOUS at 17:33

## 2021-08-11 RX ADMIN — SODIUM CHLORIDE 50 ML: 9 INJECTION, SOLUTION INTRAVENOUS at 17:23

## 2021-08-11 RX ADMIN — REMDESIVIR 100 MG: 100 INJECTION, POWDER, LYOPHILIZED, FOR SOLUTION INTRAVENOUS at 17:17

## 2021-08-11 ASSESSMENT — ACTIVITIES OF DAILY LIVING (ADL): DEPENDENT_IADLS:: INDEPENDENT

## 2021-08-11 NOTE — PLAN OF CARE
Problem: Gas Exchange Impaired  Goal: Optimal Gas Exchange  Outcome: No Change  Intervention: Optimize Oxygenation and Ventilation  Recent Flowsheet Documentation  Taken 8/10/2021 1715 by Marce Elkins RN  Head of Bed (HOB) Positioning: (eating dinner) HOB at 60-90 degrees      Attempted to decrease O2 from 3.5lpm to 3lpm.  Patient's  oxygen dipped from 96-97% on 3.5lpm to 90-91% at about 9pm. Patient denies any increased work of breathing or SOB at  90-91%.       At 2100,  Increased O2 to 3.5 lpm for bedtime.    Continue monitor    Encouraged coughing and deep breathing.

## 2021-08-11 NOTE — PROGRESS NOTES
Care Management Initial Consult    General Information  Assessment completed with: Patient,    Type of CM/SW Visit: CM Role Introduction    Primary Care Provider verified and updated as needed: No (He is searching for one )   Readmission within the last 30 days:           Advance Care Planning: Advance Care Planning Reviewed: no concerns identified      Communication Assessment  Patient's communication style: spoken language (English or Bilingual)    Hearing Difficulty or Deaf: no   Wear Glasses or Blind: no    Cognitive  Cognitive/Neuro/Behavioral: WDL           Mood/Behavior: flat affect          Living Environment:   People in home: child(rosalba), adult     Current living Arrangements: house      Able to return to prior arrangements: yes       Family/Social Support:  Care provided by: self  Provides care for: no one  Marital Status: Single  Children          Description of Support System: Supportive, Involved       Current Resources:   Patient receiving home care services: No     Community Resources: None  Equipment currently used at home: none  Supplies currently used at home: None    Employment/Financial:  Employment Status: employed part-time        Financial Concerns: No concerns identified   Referral to Financial Counselor: No       Lifestyle & Psychosocial Needs:  Social Determinants of Health     Tobacco Use: Low Risk      Smoking Tobacco Use: Never Smoker     Smokeless Tobacco Use: Never Used   Alcohol Use:      Frequency of Alcohol Consumption:      Average Number of Drinks:      Frequency of Binge Drinking:    Financial Resource Strain:      Difficulty of Paying Living Expenses:    Food Insecurity:      Worried About Running Out of Food in the Last Year:      Ran Out of Food in the Last Year:    Transportation Needs:      Lack of Transportation (Medical):      Lack of Transportation (Non-Medical):    Physical Activity:      Days of Exercise per Week:      Minutes of Exercise per Session:    Stress:       Feeling of Stress :    Social Connections:      Frequency of Communication with Friends and Family:      Frequency of Social Gatherings with Friends and Family:      Attends [employer] Services:      Active Member of Clubs or Organizations:      Attends Club or Organization Meetings:      Marital Status:    Intimate Partner Violence:      Fear of Current or Ex-Partner:      Emotionally Abused:      Physically Abused:      Sexually Abused:    Depression:      PHQ-2 Score:    Housing Stability:      Unable to Pay for Housing in the Last Year:      Number of Places Lived in the Last Year:      Unstable Housing in the Last Year:        Functional Status:  Prior to admission patient needed assistance:   Dependent ADLs:: Independent  Dependent IADLs:: Independent       Mental Health Status:  Mental Health Status: No Current Concerns       Chemical Dependency Status:  Chemical Dependency Status: No Current Concerns             Values/Beliefs:  Spiritual, Cultural Beliefs, [employer] Practices, Values that affect care: no               Additional Information:  SWCM called and introduced self and CM services to Pt via the phone due to CVOID +.  Pt recently move in with daughter in a house.  Pt independent at baseline. No CM needs anticipated.  Family to transport.     FATIMAH Dempsey

## 2021-08-11 NOTE — PLAN OF CARE
Problem: Adult Inpatient Plan of Care  Goal: Plan of Care Review  8/11/2021 1351 by Kellie Cardona RN  Outcome: Improving  Problem: Gas Exchange Impaired  Goal: Optimal Gas Exchange  8/11/2021 1351 by Kellie Cardona RN  Outcome: same    Pt continues to need O2.  He is presently on 3 L of O2 with sats in the low 90s.  He states he doesn't get SOB when he is up.  Tried to wean pt this shift but he dropped to 87%.  Pt pt back on 3 L.  He is also working on his IS and gets it up to 1500.  Pt calls appropriately for assistance.  Will continue to monitor.

## 2021-08-11 NOTE — PLAN OF CARE
Problem: Adult Inpatient Plan of Care  Goal: Plan of Care Review  8/11/2021 0454 by Kathy Chavez RN  Outcome: Improving     Problem: Gas Exchange Impaired  Goal: Optimal Gas Exchange  Outcome: Improving     Patient on 3LPM of O2 has been 90-91% with activity and 95-97% at rest.   Has denied pain tonight.  On K+ protocol with recheck scheduled for this morning  Up independently in room  IV saline locked, flushed with difficulty

## 2021-08-11 NOTE — PROGRESS NOTES
Daily Progress Note    Assessment & Plan: Iván Bills is a 66 year old male with a past medical history of prostate cancer s/p prostatectomy on 6/8/2021 admitted for acute respiratory failure 2/2 Covid infection.     Principal Problem:    Acute respiratory failure with hypoxia (H)  Active Problems:    Pneumonia due to 2019 novel coronavirus    Elevated d-dimer    #Acute respiratory failure with hypoxia 2/2 SARS CoV-2 infection  Patient had 1 week of cough and anorexia. Tested positive for Covid in the [facility] ED on 8/9/2021. Patient is unvaccinated against Covid. As of 8/10, D-dimer decreased from 0.50 to 0.44. CRP down trending from 6.5 to 2.7. O2 is at 96% on 3L NC.  - O2 supplementation via NC PRN, wean as tolerated  - CRP, CBC, BMP, D-dimer qAM for 4 days  - COVID-19 treatment protocol              - Albuterol prn              - Dexamethasone 6mg po              - Remdesivir 100mg IV  - Pain control              - Acetaminophen q8h prn    #Hyponatremia, mild, resolved  #Hypokalemia, mild, resolved  Likely 2/2 poor oral intake and weight loss, resolved as of 8/10.  - Potassium replacement protocol     #Thrombocytopenia  No s/s of bleeding as of 8/10, not at level where transfusion would be appropriate.  - Monitor  - CBC qAM     Chronic Conditions  Prostate CA, s/p prostatectomy  : Hold PTA Tadalafil  Herpes simplex : Continue PTA Valacyclovir    Diet: Regular   Fluids: PO   VTE Prophylaxis: Enoxaparin SubQ 40 mg     Discharge Planning discussed with Dr. Merino  Barriers to discharge: Medical, requiring supplemental O2  Anticipated discharge: 1-3 days  Disposition:  Home  Status: Inpatient admit  Length of Stay: 2     Patient discussed with attending physician, Dr. Edi Merino, who agrees with the plan.     Chico Guerra MD PGY1  [facility] Medicine Residency  August 11, 2021    Subjective/Interval events:   No complaints of cough or SOB. Hasn't needed any prn pain medications. BMs and urination  "continue to be normal. He is sitting comfortably in his bed this morning watching television.    ROS: Negative for chest pain, SOB, swelling, pain.     Objective  Vital signs in last 24 hours  Temp:  [97.2  F (36.2  C)-97.7  F (36.5  C)] 97.6  F (36.4  C)  Pulse:  [60-65] 65  Resp:  [18] 18  BP: (141-145)/(80-84) 141/80  SpO2:  [87 %-98 %] 96 %    Physical Exam  BP (!) 141/80 (BP Location: Right arm)   Pulse 65   Temp 97.6  F (36.4  C) (Oral)   Resp 18   Ht 1.778 m (5' 10\")   Wt 73.1 kg (161 lb 1.6 oz)   SpO2 96%   BMI 23.12 kg/m      General Appearance:    Alert, cooperative, no distress, appears stated age   Head:    Normocephalic, without obvious abnormality, atraumatic       Lungs:   Still crackles in the bases but they seem to be improving. Breathing is not labored   Heart:    Regular rate and rhythm, S1 and S2 normal, no murmur, rub   or gallop   Extremities:   Extremities normal, atraumatic, no cyanosis or edema             Intake/Output last 3 shifts  I/O last 3 completed shifts:  In: 720 [P.O.:720]  Out: -   Pertinent Labs   Recent Labs   Lab 08/11/21  0716 08/10/21  0622 08/09/21  1254   WBC 3.2* 2.6* 3.1*   HGB 14.1 14.3 14.9   HCT 41.7 41.8 42.8    114* 99*     Recent Labs   Lab 08/11/21  0716 08/10/21  0622 08/09/21  1254    136 134*   CO2 23 23 25   BUN 19 18 17   ALBUMIN  --   --  3.3*   ALKPHOS  --   --  70   ALT  --   --  41   AST  --   --  63*     Pertinent Radiology    No new imaging    Current Medications.     remdesivir  100 mg Intravenous Q24H    And     sodium chloride 0.9%  50 mL Intravenous Q24H     dexamethasone  6 mg Oral Daily     enoxaparin ANTICOAGULANT  40 mg Subcutaneous Q24H     sodium chloride (PF)  3 mL Intracatheter Q8H     valACYclovir  500 mg Oral Every Other Day     PRN: acetaminophen, albuterol  "

## 2021-08-12 LAB
ANION GAP SERPL CALCULATED.3IONS-SCNC: 8 MMOL/L (ref 5–18)
BUN SERPL-MCNC: 19 MG/DL (ref 8–22)
C REACTIVE PROTEIN LHE: 1.5 MG/DL (ref 0–0.8)
CALCIUM SERPL-MCNC: 8.8 MG/DL (ref 8.5–10.5)
CHLORIDE BLD-SCNC: 108 MMOL/L (ref 98–107)
CO2 SERPL-SCNC: 22 MMOL/L (ref 22–31)
CREAT SERPL-MCNC: 0.71 MG/DL (ref 0.7–1.3)
D DIMER PPP FEU-MCNC: 0.5 UG/ML FEU (ref 0–0.5)
ERYTHROCYTE [DISTWIDTH] IN BLOOD BY AUTOMATED COUNT: 14.6 % (ref 10–15)
GFR SERPL CREATININE-BSD FRML MDRD: >90 ML/MIN/1.73M2
GLUCOSE BLD-MCNC: 106 MG/DL (ref 70–125)
HCT VFR BLD AUTO: 44.6 % (ref 40–53)
HGB BLD-MCNC: 15.1 G/DL (ref 13.3–17.7)
MCH RBC QN AUTO: 33 PG (ref 26.5–33)
MCHC RBC AUTO-ENTMCNC: 33.9 G/DL (ref 31.5–36.5)
MCV RBC AUTO: 97 FL (ref 78–100)
PLAT MORPH BLD: NORMAL
PLATELET # BLD AUTO: 219 10E3/UL (ref 150–450)
POTASSIUM BLD-SCNC: 3.6 MMOL/L (ref 3.5–5)
RBC # BLD AUTO: 4.58 10E6/UL (ref 4.4–5.9)
RBC MORPH BLD: NORMAL
SODIUM SERPL-SCNC: 138 MMOL/L (ref 136–145)
WBC # BLD AUTO: 7 10E3/UL (ref 4–11)

## 2021-08-12 PROCEDURE — 85027 COMPLETE CBC AUTOMATED: CPT | Performed by: STUDENT IN AN ORGANIZED HEALTH CARE EDUCATION/TRAINING PROGRAM

## 2021-08-12 PROCEDURE — 36415 COLL VENOUS BLD VENIPUNCTURE: CPT | Performed by: STUDENT IN AN ORGANIZED HEALTH CARE EDUCATION/TRAINING PROGRAM

## 2021-08-12 PROCEDURE — 85379 FIBRIN DEGRADATION QUANT: CPT | Performed by: STUDENT IN AN ORGANIZED HEALTH CARE EDUCATION/TRAINING PROGRAM

## 2021-08-12 PROCEDURE — 120N000001 HC R&B MED SURG/OB

## 2021-08-12 PROCEDURE — 250N000009 HC RX 250: Performed by: STUDENT IN AN ORGANIZED HEALTH CARE EDUCATION/TRAINING PROGRAM

## 2021-08-12 PROCEDURE — 99232 SBSQ HOSP IP/OBS MODERATE 35: CPT | Mod: GC

## 2021-08-12 PROCEDURE — 258N000003 HC RX IP 258 OP 636: Performed by: STUDENT IN AN ORGANIZED HEALTH CARE EDUCATION/TRAINING PROGRAM

## 2021-08-12 PROCEDURE — 250N000011 HC RX IP 250 OP 636: Performed by: STUDENT IN AN ORGANIZED HEALTH CARE EDUCATION/TRAINING PROGRAM

## 2021-08-12 PROCEDURE — 80048 BASIC METABOLIC PNL TOTAL CA: CPT | Performed by: STUDENT IN AN ORGANIZED HEALTH CARE EDUCATION/TRAINING PROGRAM

## 2021-08-12 PROCEDURE — 86141 C-REACTIVE PROTEIN HS: CPT | Performed by: STUDENT IN AN ORGANIZED HEALTH CARE EDUCATION/TRAINING PROGRAM

## 2021-08-12 PROCEDURE — 250N000013 HC RX MED GY IP 250 OP 250 PS 637: Performed by: STUDENT IN AN ORGANIZED HEALTH CARE EDUCATION/TRAINING PROGRAM

## 2021-08-12 PROCEDURE — 250N000012 HC RX MED GY IP 250 OP 636 PS 637: Performed by: STUDENT IN AN ORGANIZED HEALTH CARE EDUCATION/TRAINING PROGRAM

## 2021-08-12 RX ORDER — DEXAMETHASONE 6 MG/1
6 TABLET ORAL DAILY
Qty: 6 TABLET | Refills: 0 | Status: SHIPPED | OUTPATIENT
Start: 2021-08-13 | End: 2021-08-19

## 2021-08-12 RX ADMIN — POLYETHYLENE GLYCOL 3350 17 G: 17 POWDER, FOR SOLUTION ORAL at 10:06

## 2021-08-12 RX ADMIN — SODIUM CHLORIDE 50 ML: 9 INJECTION, SOLUTION INTRAVENOUS at 18:23

## 2021-08-12 RX ADMIN — DEXAMETHASONE 6 MG: 2 TABLET ORAL at 13:51

## 2021-08-12 RX ADMIN — VALACYCLOVIR HYDROCHLORIDE 500 MG: 500 TABLET, FILM COATED ORAL at 10:02

## 2021-08-12 RX ADMIN — ENOXAPARIN SODIUM 40 MG: 100 INJECTION SUBCUTANEOUS at 18:20

## 2021-08-12 RX ADMIN — REMDESIVIR 100 MG: 100 INJECTION, POWDER, LYOPHILIZED, FOR SOLUTION INTRAVENOUS at 18:20

## 2021-08-12 ASSESSMENT — MIFFLIN-ST. JEOR: SCORE: 1521.98

## 2021-08-12 NOTE — PLAN OF CARE
Problem: Gas Exchange Impaired  Goal: Optimal Gas Exchange  Outcome: Improving  Intervention: Optimize Oxygenation and Ventilation  Recent Flowsheet Documentation  Taken 8/11/2021 1710 by Marce Elkins RN  Head of Bed (HOB) Positioning: HOB at 45 degrees    Patient doing cough & deep breathing and oxygen sats are 95% at 2.5lpm

## 2021-08-12 NOTE — PROGRESS NOTES
Daily Progress Note    Assessment & Plan: Iván Bills is a 66 year old male with a past medical history of prostate cancer s/p prostatectomy on 6/8/2021 admitted for acute respiratory failure 2/2 Covid infection.     Principal Problem:    Acute respiratory failure with hypoxia (H)  Active Problems:    Pneumonia due to 2019 novel coronavirus    Elevated d-dimer    #Acute respiratory failure with hypoxia 2/2 SARS CoV-2 infection  Patient had 1 week of cough and anorexia. Tested positive for Covid in the [facility] ED on 8/9/2021. Patient is unvaccinated against Covid. D-dimer WNL and CRP down trending to 2.7.  O2 is at 96% on 2L NC. He maybe able to discharge as soon as tomorrow.   - O2 supplementation via NC PRN, wean as tolerated  - CRP, CBC, BMP, D-dimer qAM for 4 days  - COVID-19 treatment protocol              - Albuterol prn              - Dexamethasone 6mg po              - Remdesivir 100mg IV  - Pain control              - Acetaminophen q8h prn    # Constipation  Patient hasn't had BM in 3 days  -Miralax PRN    #Hyponatremia, mild, resolved  #Hypokalemia, mild, resolved  Likely 2/2 poor oral intake and weight loss, resolved as of 8/10.  - Potassium replacement protocol     #Thrombocytopenia, resolved  No s/s of bleeding as of 8/10, not at level where transfusion would be appropriate.  - Monitor with next CBC     Chronic Conditions  Prostate CA, s/p prostatectomy  : Hold PTA Tadalafil  Herpes simplex : Continue PTA Valacyclovir    Diet: Regular   Fluids: PO   VTE Prophylaxis: Enoxaparin SubQ 40 mg     Discharge Planning discussed with Dr. Merino  Barriers to discharge: Medical, requiring supplemental O2  Anticipated discharge: 1-3 days  Disposition:  Home  Status: Inpatient admit  Length of Stay: 3     Patient discussed with attending physician, Dr. Edi Merino, who agrees with the plan.     Chico Guerra MD PGY1  [facility] Family Medicine Residency  August 12, 2021    Subjective/Interval events:  "  Patient is feeling much improved since his admission. He is resting comfortably in his bedHe hasn't had a BM in 3 days but does not feel uncomfortable. He was saturating at 97% on RA while I was in the room and he states that his cough is much improved.     ROS: Negative for chest pain, SOB, swelling, pain, fever.     Objective  Vital signs in last 24 hours  Temp:  [97.2  F (36.2  C)-97.9  F (36.6  C)] 97.9  F (36.6  C)  Pulse:  [64-65] 64  Resp:  [18-20] 20  BP: (140-157)/(75-86) 157/86  FiO2 (%):  [2 %] 2 %  SpO2:  [87 %-96 %] 94 %    Physical Exam  BP (!) 157/86 (BP Location: Right arm)   Pulse 64   Temp 97.9  F (36.6  C) (Oral)   Resp 20   Ht 1.778 m (5' 10\")   Wt 73.6 kg (162 lb 3.2 oz)   SpO2 94%   BMI 23.27 kg/m      General Appearance:    Alert, cooperative, no distress, appears stated age   Head:    Normocephalic, without obvious abnormality, atraumatic       Lungs:   Crackles in lung bases but they seem to be improving. Breathing is not labored   Heart:    Regular rate and rhythm, S1 and S2 normal, no murmur, rub   or gallop   Extremities:   Extremities normal, atraumatic, no cyanosis or edema             Intake/Output last 3 shifts  I/O last 3 completed shifts:  In: 1190 [P.O.:840; I.V.:350]  Out: 175 [Urine:175]  Pertinent Labs   Recent Labs   Lab 08/12/21 0619 08/11/21  0716 08/10/21  0622   WBC 7.0 3.2* 2.6*   HGB 15.1 14.1 14.3   HCT 44.6 41.7 41.8    151 114*     Recent Labs   Lab 08/12/21 0619 08/11/21  0716 08/10/21  0622 08/09/21  1254    139 136 134*   CO2 22 23 23 25   BUN 19 19 18 17   ALBUMIN  --   --   --  3.3*   ALKPHOS  --   --   --  70   ALT  --   --   --  41   AST  --   --   --  63*     Pertinent Radiology    No new imaging    Current Medications.     remdesivir  100 mg Intravenous Q24H    And     sodium chloride 0.9%  50 mL Intravenous Q24H     dexamethasone  6 mg Oral Daily     enoxaparin ANTICOAGULANT  40 mg Subcutaneous Q24H     sodium chloride (PF)  3 mL " Intracatheter Q8H     valACYclovir  500 mg Oral Every Other Day     PRN: acetaminophen, albuterol

## 2021-08-12 NOTE — PROGRESS NOTES
BRIEF UPDATE    Patient passed O2 walking trial in room, but now after exertion extremely fatigued, weak and doesn't feel safe to drive himself home. He's wondering if he can rest overnight and discharge in the AM.  I am comfortable with that plan. Discharge orders are in besides discharge patient; just page when he feels strong enough to leave.    Cecile Holley MD MD PGY3  [facility]

## 2021-08-12 NOTE — PLAN OF CARE
Problem: Adult Inpatient Plan of Care  Goal: Plan of Care Review  Outcome: Improving     Problem: Gas Exchange Impaired  Goal: Optimal Gas Exchange  Outcome: Improving    Patient is on 2L of O2 via nasal cannula, with sats in the low 90's.   Patient up independent in the room, and is voiding.  BP in the 140's-150's /70's-80's.  No complaints of pain.

## 2021-08-12 NOTE — DISCHARGE SUMMARY
Discharge Summary    Primary Care Physician:  [facility]  Discharge Provider: Chico Guerra MD   Consult/s: None  Admission Date: 8/9/2021.   Admission Diagnoses:   Acute respiratory failure with hypoxia (H) [J96.01]  Pneumonia due to 2019 novel coronavirus [U07.1, J12.82]   Discharge Date: 8/13/2021  Disposition: Home  Condition at Discharge: Stable, improving  Code Status: Full Code  Principal Diagnosis:  Acute respiratory failure with hypoxia (H)  Discharge Diagnoses:    Principal Problem:    Acute respiratory failure with hypoxia (H)  Active Problems:    Pneumonia due to 2019 novel coronavirus    Elevated d-dimer    Hyponatremia    Hypokalemia    Constipation    Reason for Admission:   Iván Bills is a 66 year old male who presented on the day of admission with Acute Respiratory Failure caused by covid infection.    Hospital Summary:   Iván Bills is a 66 year old male admitted on 8/9/2021 with cough, anorexia, and weakness. He tested positive for covid on admission to the ED. He was treated with Redesivir and dexamethasone.     Discharge Medications:     Review of your medicines      START taking      Dose / Directions   dexamethasone 6 MG tablet  Commonly known as: DECADRON      Dose: 6 mg  Take 1 tablet (6 mg) by mouth daily for 6 days  Quantity: 6 tablet  Refills: 0        CONTINUE these medicines which may have CHANGED, or have new prescriptions. If we are uncertain of the size of tablets/capsules you have at home, strength may be listed as something that might have changed.      Dose / Directions   valACYclovir 500 MG tablet  Commonly known as: VALTREX  This may have changed: See the new instructions.  Used for: Herpes      [VALACYCLOVIR (VALTREX) 500 MG TABLET] TAKE ONE TABLET BY MOUTH ONCE DAILY  Quantity: 6 tablet  Refills: 0        CONTINUE these medicines which have NOT CHANGED      Dose / Directions   tadalafil 5 MG tablet  Commonly known as: CIALIS  Indication: Prostate  Cancer      Dose: 5 mg  Take 5 mg by mouth every 24 hours  Refills: 0           Where to get your medicines      Some of these will need a paper prescription and others can be bought over the counter. Ask your nurse if you have questions.    Bring a paper prescription for each of these medications    dexamethasone 6 MG tablet       Changes to Home Medications and Reasonin) Start taking Dexamethasone (decadron) 6mg once a day for 6 days    Significant Laboratory Studies:   Recent Labs   Lab 21  0716 21  1254    138 139 134*   CO2 22 22 23 25   BUN 14 19 19 17   ALKPHOS  --   --   --  70   ALT  --   --   --  41   AST  --   --   --  63*     Recent Labs   Lab 21  0716   WBC 6.1 7.0 3.2*   HGB 14.2 15.1 14.1   HCT 41.2 44.6 41.7    219 151     Significant Radiology Studies:   CT Chest Pulmonary Embolism w Contrast    Result Date: 2021    IMPRESSION: 1.  Motion artifact. 2.  No obvious pulmonary embolism. 3.  Mild-moderate left lower lung predominant opacities typical of COVID. 4.  Enlarged ascending aorta up to 4.2 cm.     Discharge Instructions:  Follow up contact phone number for patient:[phone]   Follow up appointment with Primary Care Physician within 1 week.  Diet: Regular diet  Activity: Activity as tolerated  Restrictions: None     Physical Exam:    Temp:  [97.4  F (36.3  C)-97.6  F (36.4  C)] 97.6  F (36.4  C)  Pulse:  [62-91] 80  Resp:  [16-20] 16  BP: (149-174)/(80-91) 174/91  SpO2:  [92 %-96 %] 94 %  General appearance: alert, appears stated age and cooperative  Head: Normocephalic, without obvious abnormality, atraumatic  Lungs: Mild crackles in the bilateral lower lobes.  Heart: RRR, no murmur, rubs, or gallops     Patient discussed with attending physician, Dr. Edi Merino , who agrees with the plan.      Chico Guerra MD PGY1   2021  [facility] Medicine Residency  Program

## 2021-08-13 VITALS
TEMPERATURE: 97.6 F | HEART RATE: 80 BPM | OXYGEN SATURATION: 94 % | WEIGHT: 162.3 LBS | RESPIRATION RATE: 16 BRPM | DIASTOLIC BLOOD PRESSURE: 91 MMHG | HEIGHT: 70 IN | SYSTOLIC BLOOD PRESSURE: 174 MMHG | BODY MASS INDEX: 23.24 KG/M2

## 2021-08-13 PROBLEM — E87.1 HYPONATREMIA: Status: ACTIVE | Noted: 2021-08-13

## 2021-08-13 PROBLEM — K59.00 CONSTIPATION: Status: ACTIVE | Noted: 2021-08-12

## 2021-08-13 PROBLEM — E87.6 HYPOKALEMIA: Status: ACTIVE | Noted: 2021-08-09

## 2021-08-13 LAB
ANION GAP SERPL CALCULATED.3IONS-SCNC: 9 MMOL/L (ref 5–18)
BUN SERPL-MCNC: 14 MG/DL (ref 8–22)
C REACTIVE PROTEIN LHE: 4.4 MG/DL (ref 0–0.8)
CALCIUM SERPL-MCNC: 8.4 MG/DL (ref 8.5–10.5)
CHLORIDE BLD-SCNC: 107 MMOL/L (ref 98–107)
CO2 SERPL-SCNC: 22 MMOL/L (ref 22–31)
CREAT SERPL-MCNC: 0.64 MG/DL (ref 0.7–1.3)
D DIMER PPP FEU-MCNC: 0.5 UG/ML FEU (ref 0–0.5)
ERYTHROCYTE [DISTWIDTH] IN BLOOD BY AUTOMATED COUNT: 14.5 % (ref 10–15)
GFR SERPL CREATININE-BSD FRML MDRD: >90 ML/MIN/1.73M2
GLUCOSE BLD-MCNC: 100 MG/DL (ref 70–125)
HCT VFR BLD AUTO: 41.2 % (ref 40–53)
HGB BLD-MCNC: 14.2 G/DL (ref 13.3–17.7)
HOLD SPECIMEN: NORMAL
MCH RBC QN AUTO: 33.7 PG (ref 26.5–33)
MCHC RBC AUTO-ENTMCNC: 34.5 G/DL (ref 31.5–36.5)
MCV RBC AUTO: 98 FL (ref 78–100)
PLATELET # BLD AUTO: 183 10E3/UL (ref 150–450)
POTASSIUM BLD-SCNC: 3.5 MMOL/L (ref 3.5–5)
RBC # BLD AUTO: 4.21 10E6/UL (ref 4.4–5.9)
SODIUM SERPL-SCNC: 138 MMOL/L (ref 136–145)
WBC # BLD AUTO: 6.1 10E3/UL (ref 4–11)

## 2021-08-13 PROCEDURE — 36415 COLL VENOUS BLD VENIPUNCTURE: CPT | Performed by: STUDENT IN AN ORGANIZED HEALTH CARE EDUCATION/TRAINING PROGRAM

## 2021-08-13 PROCEDURE — 85379 FIBRIN DEGRADATION QUANT: CPT | Performed by: STUDENT IN AN ORGANIZED HEALTH CARE EDUCATION/TRAINING PROGRAM

## 2021-08-13 PROCEDURE — 99238 HOSP IP/OBS DSCHRG MGMT 30/<: CPT | Mod: GC

## 2021-08-13 PROCEDURE — 85027 COMPLETE CBC AUTOMATED: CPT | Performed by: STUDENT IN AN ORGANIZED HEALTH CARE EDUCATION/TRAINING PROGRAM

## 2021-08-13 PROCEDURE — 86141 C-REACTIVE PROTEIN HS: CPT | Performed by: STUDENT IN AN ORGANIZED HEALTH CARE EDUCATION/TRAINING PROGRAM

## 2021-08-13 PROCEDURE — 82947 ASSAY GLUCOSE BLOOD QUANT: CPT | Performed by: STUDENT IN AN ORGANIZED HEALTH CARE EDUCATION/TRAINING PROGRAM

## 2021-08-13 ASSESSMENT — MIFFLIN-ST. JEOR: SCORE: 1522.44

## 2021-08-13 NOTE — PLAN OF CARE
Problem: Gas Exchange Impaired  Goal: Optimal Gas Exchange  Outcome: Improving   Patient is on room air throughout the shift and oxygen saturations remain greater than 90%. Lung sounds are diminished with fine crackles. Occasional dry cough. Patient resting/sleeping between cares and assessments.  Problem: Adult Inpatient Plan of Care  Goal: Plan of Care Review  Outcome: Improving   Plan to discharge home 8/13/2021.  Beverley Malcolm RN  8/13/2021

## 2021-08-13 NOTE — PLAN OF CARE
Problem: Adult Inpatient Plan of Care  Goal: Readiness for Transition of Care  Outcome: Completed   Discharge instructions reviewed and acknowledged. No questions or concerns brought up at this time. Discharged home.

## 2021-08-13 NOTE — PROGRESS NOTES
Care Management Discharge Note    Discharge Date: 08/13/2021       Discharge Disposition: Home    Discharge Services: None    Discharge DME: None    Discharge Transportation: family or friend will provide    Private pay costs discussed: Not applicable    PAS Confirmation Code:  (not needed)  Patient/family educated on Medicare website which has current facility and service quality ratings: other (see comments) (Not Needed)    Education Provided on the Discharge Plan:  Per Team  Persons Notified of Discharge Plans: Patient  Patient/Family in Agreement with the Plan: yes    Handoff Referral Completed: Not Needed    Additional Information:  Plan to discharge home today. No needs identified. Patient to arrange transport. Attached COVID-19 web resources to [facility].         Matilde Scanlon RN

## 2021-08-13 NOTE — DISCHARGE INSTRUCTIONS
It was a pleasure taking care of you during this hospital visit.    Continue taking dexamethasone (decadron) for 6 more days.    We recommend that you quarantine through 8/16/2021 as long as you remain without a fever.     We also recommend that other people in your household get vaccinated as a precaution.       For more help and resources related to COVID 19 please visit the following website: https://[place].gov/covid19/for[place]/get-help/index.jsp

## 2021-08-14 DIAGNOSIS — Z71.89 OTHER SPECIFIED COUNSELING: ICD-10-CM

## 2021-08-15 ENCOUNTER — PATIENT OUTREACH (OUTPATIENT)
Dept: CARE COORDINATION | Facility: CLINIC | Age: 66
End: 2021-08-15

## 2021-08-15 NOTE — PROGRESS NOTES
Clinic Care Coordination Contact  [facility]/Voicemail    Clinical Data: Care Coordinator Outreach  Reason for referral: TCM outreach  Outreach attempted x 1.  Left message on patient's voicemail with call back information and requested return call.  Plan:. Care Coordinator will try to reach patient again in 1-2 business days.    Nanci Hudson   Community Health Worker   [facility]

## 2021-08-16 NOTE — PROGRESS NOTES
Clinic Care Coordination Contact  [facility]/Voicemail       Clinical Data: Care Coordinator Outreach  Outreach attempted x 2.  Left message on patient's voicemail with call back information and requested return call.  Plan: Care Coordinator will do no further outreaches at this time.    CODY Olivier  [phone]  [facility]

## 2021-09-20 NOTE — PROGRESS NOTES
Addendum to H&P dated 8/9/2021    ROS: Complete ROS was negative with pertinent items noted in the HPI.    Chico Guerra MD

## 2021-10-16 ENCOUNTER — HEALTH MAINTENANCE LETTER (OUTPATIENT)
Age: 66
End: 2021-10-16

## 2022-07-17 ENCOUNTER — HEALTH MAINTENANCE LETTER (OUTPATIENT)
Age: 67
End: 2022-07-17

## 2022-09-25 ENCOUNTER — HEALTH MAINTENANCE LETTER (OUTPATIENT)
Age: 67
End: 2022-09-25

## 2023-08-06 ENCOUNTER — HEALTH MAINTENANCE LETTER (OUTPATIENT)
Age: 68
End: 2023-08-06